# Patient Record
Sex: MALE | Race: WHITE | NOT HISPANIC OR LATINO | Employment: FULL TIME | URBAN - METROPOLITAN AREA
[De-identification: names, ages, dates, MRNs, and addresses within clinical notes are randomized per-mention and may not be internally consistent; named-entity substitution may affect disease eponyms.]

---

## 2017-06-12 ENCOUNTER — HOSPITAL ENCOUNTER (OUTPATIENT)
Dept: LAB | Facility: MEDICAL CENTER | Age: 37
End: 2017-06-12
Attending: FAMILY MEDICINE
Payer: COMMERCIAL

## 2017-06-12 LAB
25(OH)D3 SERPL-MCNC: 20 NG/ML (ref 30–100)
ALBUMIN SERPL BCP-MCNC: 4.1 G/DL (ref 3.2–4.9)
ALBUMIN/GLOB SERPL: 1.3 G/DL
ALP SERPL-CCNC: 53 U/L (ref 30–99)
ALT SERPL-CCNC: 26 U/L (ref 2–50)
ANION GAP SERPL CALC-SCNC: 10 MMOL/L (ref 0–11.9)
AST SERPL-CCNC: 21 U/L (ref 12–45)
BASOPHILS # BLD AUTO: 0.6 % (ref 0–1.8)
BASOPHILS # BLD: 0.05 K/UL (ref 0–0.12)
BILIRUB SERPL-MCNC: 0.8 MG/DL (ref 0.1–1.5)
BUN SERPL-MCNC: 14 MG/DL (ref 8–22)
CALCIUM SERPL-MCNC: 9.4 MG/DL (ref 8.5–10.5)
CHLORIDE SERPL-SCNC: 102 MMOL/L (ref 96–112)
CHOLEST SERPL-MCNC: 201 MG/DL (ref 100–199)
CO2 SERPL-SCNC: 23 MMOL/L (ref 20–33)
CREAT SERPL-MCNC: 1.06 MG/DL (ref 0.5–1.4)
EOSINOPHIL # BLD AUTO: 0.06 K/UL (ref 0–0.51)
EOSINOPHIL NFR BLD: 0.7 % (ref 0–6.9)
ERYTHROCYTE [DISTWIDTH] IN BLOOD BY AUTOMATED COUNT: 41.7 FL (ref 35.9–50)
GFR SERPL CREATININE-BSD FRML MDRD: >60 ML/MIN/1.73 M 2
GLOBULIN SER CALC-MCNC: 3.1 G/DL (ref 1.9–3.5)
GLUCOSE SERPL-MCNC: 86 MG/DL (ref 65–99)
HCT VFR BLD AUTO: 50.1 % (ref 42–52)
HDLC SERPL-MCNC: 33 MG/DL
HGB BLD-MCNC: 17.1 G/DL (ref 14–18)
IMM GRANULOCYTES # BLD AUTO: 0.02 K/UL (ref 0–0.11)
IMM GRANULOCYTES NFR BLD AUTO: 0.2 % (ref 0–0.9)
LDLC SERPL CALC-MCNC: 110 MG/DL
LYMPHOCYTES # BLD AUTO: 2.41 K/UL (ref 1–4.8)
LYMPHOCYTES NFR BLD: 29 % (ref 22–41)
MCH RBC QN AUTO: 30.9 PG (ref 27–33)
MCHC RBC AUTO-ENTMCNC: 34.1 G/DL (ref 33.7–35.3)
MCV RBC AUTO: 90.4 FL (ref 81.4–97.8)
MONOCYTES # BLD AUTO: 0.66 K/UL (ref 0–0.85)
MONOCYTES NFR BLD AUTO: 8 % (ref 0–13.4)
NEUTROPHILS # BLD AUTO: 5.1 K/UL (ref 1.82–7.42)
NEUTROPHILS NFR BLD: 61.5 % (ref 44–72)
NRBC # BLD AUTO: 0 K/UL
NRBC BLD AUTO-RTO: 0 /100 WBC
PLATELET # BLD AUTO: 251 K/UL (ref 164–446)
PMV BLD AUTO: 11.1 FL (ref 9–12.9)
POTASSIUM SERPL-SCNC: 3.5 MMOL/L (ref 3.6–5.5)
PROT SERPL-MCNC: 7.2 G/DL (ref 6–8.2)
RBC # BLD AUTO: 5.54 M/UL (ref 4.7–6.1)
SODIUM SERPL-SCNC: 135 MMOL/L (ref 135–145)
TRIGL SERPL-MCNC: 289 MG/DL (ref 0–149)
WBC # BLD AUTO: 8.3 K/UL (ref 4.8–10.8)

## 2017-06-12 PROCEDURE — 85025 COMPLETE CBC W/AUTO DIFF WBC: CPT

## 2017-06-12 PROCEDURE — 82306 VITAMIN D 25 HYDROXY: CPT

## 2017-06-12 PROCEDURE — 84270 ASSAY OF SEX HORMONE GLOBUL: CPT

## 2017-06-12 PROCEDURE — 36415 COLL VENOUS BLD VENIPUNCTURE: CPT

## 2017-06-12 PROCEDURE — 84403 ASSAY OF TOTAL TESTOSTERONE: CPT

## 2017-06-12 PROCEDURE — 80061 LIPID PANEL: CPT

## 2017-06-12 PROCEDURE — 84402 ASSAY OF FREE TESTOSTERONE: CPT

## 2017-06-12 PROCEDURE — 80053 COMPREHEN METABOLIC PANEL: CPT

## 2017-06-14 LAB
SHBG SERPL-SCNC: 22 NMOL/L (ref 11–80)
TESTOST FREE MFR SERPL: 2.3 % (ref 1.6–2.9)
TESTOST FREE SERPL-MCNC: 118 PG/ML (ref 47–244)
TESTOST SERPL-MCNC: 514 NG/DL (ref 300–1080)

## 2017-07-30 ENCOUNTER — HOSPITAL ENCOUNTER (EMERGENCY)
Facility: MEDICAL CENTER | Age: 37
End: 2017-07-30
Attending: EMERGENCY MEDICINE
Payer: COMMERCIAL

## 2017-07-30 VITALS
OXYGEN SATURATION: 94 % | HEIGHT: 75 IN | RESPIRATION RATE: 16 BRPM | TEMPERATURE: 97.3 F | BODY MASS INDEX: 31.71 KG/M2 | HEART RATE: 88 BPM | SYSTOLIC BLOOD PRESSURE: 141 MMHG | WEIGHT: 255 LBS | DIASTOLIC BLOOD PRESSURE: 97 MMHG

## 2017-07-30 DIAGNOSIS — G51.0 BELL'S PALSY: ICD-10-CM

## 2017-07-30 PROCEDURE — A9270 NON-COVERED ITEM OR SERVICE: HCPCS | Performed by: EMERGENCY MEDICINE

## 2017-07-30 PROCEDURE — 99283 EMERGENCY DEPT VISIT LOW MDM: CPT

## 2017-07-30 PROCEDURE — 700102 HCHG RX REV CODE 250 W/ 637 OVERRIDE(OP): Performed by: EMERGENCY MEDICINE

## 2017-07-30 PROCEDURE — 700111 HCHG RX REV CODE 636 W/ 250 OVERRIDE (IP): Performed by: EMERGENCY MEDICINE

## 2017-07-30 RX ORDER — PREDNISONE 20 MG/1
80 TABLET ORAL ONCE
Status: COMPLETED | OUTPATIENT
Start: 2017-07-30 | End: 2017-07-30

## 2017-07-30 RX ORDER — ACYCLOVIR 200 MG/1
400 CAPSULE ORAL ONCE
Status: COMPLETED | OUTPATIENT
Start: 2017-07-30 | End: 2017-07-30

## 2017-07-30 RX ORDER — PREDNISONE 20 MG/1
60 TABLET ORAL DAILY
Qty: 30 TAB | Refills: 0 | Status: SHIPPED | OUTPATIENT
Start: 2017-07-30 | End: 2017-08-06

## 2017-07-30 RX ORDER — ACYCLOVIR 200 MG/1
400 CAPSULE ORAL
Qty: 70 CAP | Refills: 0 | Status: SHIPPED | OUTPATIENT
Start: 2017-07-30 | End: 2017-08-06

## 2017-07-30 RX ADMIN — ACYCLOVIR 400 MG: 200 CAPSULE ORAL at 02:21

## 2017-07-30 RX ADMIN — PREDNISONE 80 MG: 20 TABLET ORAL at 02:22

## 2017-07-30 NOTE — DISCHARGE INSTRUCTIONS
Your symptoms and physical exam indicate that you have Bell's palsy. There is no evidence of a stroke. Fill your prescriptions and use them as directed. Please call to schedule follow-up with her primary care doctor. Please read the information below. Return to the emergency department for symptoms that extend beyond your face.    Bell's Palsy  Bell's palsy is a condition in which one side of the face becomes temporarily weak or paralyzed. Most of the time no cause is found. A viral infection of the facial nerve is the most commonly suspected cause. The condition almost always clears up in a few weeks to months. However, in a small number of people, the weakness can be permanent. Sometimes steroid medicines and antiviral medicines are prescribed to improve recovery time. Blood and other tests are usually not needed, but they may be performed at your caregiver's discretion, to rule out other causes.  Careful follow up is importantto be sure the facial nerve is recovering. Because facial weakness can make it hard to blink, it is important to prevent drying of the eye. Artificial tears are often prescribed to keep the eye lubricated. Glasses or an eye patch should be worn to protect the eye, if you cannot close your eye completely. If the eye is not protected, permanent damage can be done to the cornea (clear covering over your eye). Sometimes facial massage and exercises help weakened muscles recover.   SEEK IMMEDIATE MEDICAL CARE IF:   · You have increased weakness, earache, headache, or dizziness.  · You develop new problems or symptoms, or the area of weakness or paralysis extends beyond the face.  · You feel you are getting worse.  Document Released: 01/25/2006 Document Revised: 03/11/2013 Document Reviewed: 12/27/2010  [employer]® Patient Information ©2014 [employer].

## 2017-07-30 NOTE — ED NOTES
Work note & prescriptions given to pt.   Kenalog Preparation: Kenalog Treatment Number (Optional): 10 Administered By (Optional): Ann Conway Consent: The risks of atrophy were reviewed with the patient. Lot # (Optional): -DK Ndc# For Kenalog Only: 8566-0501-99 Detail Level: Zone X Size Of Lesion In Cm (Optional): 0 Include Z78.9 (Other Specified Conditions Influencing Health Status) As An Associated Diagnosis?: No Medical Necessity Clause: This procedure was medically necessary because the lesions that were treated were: Total Volume Injected (Ccs- Only Use Numbers And Decimals): 0.2 Concentration Of Solution Injected (Mg/Ml): 0.3 Expiration Date (Optional): apr/2018
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
## 2017-07-30 NOTE — ED AVS SNAPSHOT
7/30/2017    Deven Akers Aye  [address] [place] [address]    Dear Deven:    [facility] wants to ensure your discharge home is safe and you or your loved ones have had all of your questions answered regarding your care after you leave the hospital.    Below is a list of resources and contact information should you have any questions regarding your hospital stay, follow-up instructions, or active medical symptoms.    Questions or Concerns Regarding… Contact   Medical Questions Related to Your Discharge  (7 days a week, 8am-5pm) Contact a Nurse Care Coordinator   [phone]   Medical Questions Not Related to Your Discharge  (24 hours a day / 7 days a week)  Contact the Nurse Health Line   [phone]    Medications or Discharge Instructions Refer to your discharge packet   or contact your [facility] Primary Care Provider   [phone]   Follow-up Appointment(s) Schedule your appointment via [employer]   or contact Scheduling [phone]   Billing Review your statement via [employer]  or contact Billing [phone]   Medical Records Review your records via [employer]   or contact Medical Records [phone]     You may receive a telephone call within two days of discharge. This call is to make certain you understand your discharge instructions and have the opportunity to have any questions answered. You can also easily access your medical information, test results and upcoming appointments via the [employer] free online health management tool. You can learn more and sign up at [employer]. For assistance setting up your [employer] account, please call [phone].    Once again, we want to ensure your discharge home is safe and that you have a clear understanding of any next steps in your care. If you have any questions or concerns, please do not hesitate to contact us, we are here for you. Thank you for choosing [facility] for your healthcare needs.    Sincerely,    Your [facility] Healthcare Team

## 2017-07-30 NOTE — ED AVS SNAPSHOT
[employer] Access Code: [email or web address]  Expires: 8/29/2017  2:39 AM    [employer]  A secure, online tool to manage your health information     [employer]’s [employer]® is a secure, online tool that connects you to your personalized health information from the privacy of your home -- day or night - making it very easy for you to manage your healthcare. Once the activation process is completed, you can even access your medical information using the [employer] matilda, which is available for free in the [employer] Matilda store or Google Play store.     [employer] provides the following levels of access (as shown below):   My Chart Features   [facility] Primary Care Doctor [facility]  Specialists [facility]  Urgent  Care Non-[facility]  Primary Care  Doctor   Email your healthcare team securely and privately 24/7 X X X X   Manage appointments: schedule your next appointment; view details of past/upcoming appointments X      Request prescription refills. X      View recent personal medical records, including lab and immunizations X X X X   View health record, including health history, allergies, medications X X X X   Read reports about your outpatient visits, procedures, consult and ER notes X X X X   See your discharge summary, which is a recap of your hospital and/or ER visit that includes your diagnosis, lab results, and care plan. X X       How to register for [employer]:  1. Go to  [email or web address].  2. Click on the Sign Up Now box, which takes you to the New Member Sign Up page. You will need to provide the following information:  a. Enter your [employer] Access Code exactly as it appears at the top of this page. (You will not need to use this code after you’ve completed the sign-up process. If you do not sign up before the expiration date, you must request a new code.)   b. Enter your date of birth.   c. Enter your home email address.   d. Click Submit, and follow the next screen’s instructions.  3. Create a [employer] ID. This will be your [employer]  login ID and cannot be changed, so think of one that is secure and easy to remember.  4. Create a [employer] password. You can change your password at any time.  5. Enter your Password Reset Question and Answer. This can be used at a later time if you forget your password.   6. Enter your e-mail address. This allows you to receive e-mail notifications when new information is available in [employer].  7. Click Sign Up. You can now view your health information.    For assistance activating your [employer] account, call [phone]

## 2017-07-30 NOTE — ED AVS SNAPSHOT
Home Care Instructions                                                                                                                Deven Griffiths   MRN: [id number]    Department:  [facility], Emergency Dept   Date of Visit:  7/30/2017            [facility], Emergency Dept    [address] [place] [address]    Phone:  [phone]      You were seen by     Lj Garsia M.D.      Your Diagnosis Was     Bell's palsy     G51.0       These are the medications you received during your hospitalization from 07/30/2017 0206 to 07/30/2017 0239     Date/Time Order Dose Route Action    07/30/2017 0222 predniSONE (DELTASONE) tablet 80 mg 80 mg Oral Given    07/30/2017 0221 acyclovir (ZOVIRAX) capsule 400 mg 400 mg Oral Given      Follow-up Information     1. Follow up with Mike Phoenix M.D.. Call in 1 day.    Specialty:  Family Medicine    Contact information    [address] [place] [address] [phone]        Medication Information     Review all of your home medications and newly ordered medications with your primary doctor and/or pharmacist as soon as possible. Follow medication instructions as directed by your doctor and/or pharmacist.     Please keep your complete medication list with you and share with your physician. Update the information when medications are discontinued, doses are changed, or new medications (including over-the-counter products) are added; and carry medication information at all times in the event of emergency situations.               Medication List      START taking these medications        Instructions    Morning Afternoon Evening Bedtime    acyclovir 200 MG Caps   Last time this was given:  400 mg on 7/30/2017  2:21 AM   Commonly known as:  ZOVIRAX        Take 2 Caps by mouth 5 Times a Day for 7 days.   Dose:  400 mg                        predniSONE 20 MG Tabs   Last time this was given:  80 mg on  7/30/2017  2:22 AM   Commonly known as:  DELTASONE        Take 3 Tabs by mouth every day for 7 days.   Dose:  60 mg                          ASK your doctor about these medications        Instructions    Morning Afternoon Evening Bedtime    cyclobenzaprine 5 MG tablet   Commonly known as:  FLEXERIL        Take 1-2 Tabs by mouth 3 times a day as needed.   Dose:  5-10 mg                             Where to Get Your Medications      You can get these medications from any pharmacy     Bring a paper prescription for each of these medications    - acyclovir 200 MG Caps  - predniSONE 20 MG Tabs              Discharge Instructions       Your symptoms and physical exam indicate that you have Bell's palsy. There is no evidence of a stroke. Fill your prescriptions and use them as directed. Please call to schedule follow-up with her primary care doctor. Please read the information below. Return to the emergency department for symptoms that extend beyond your face.    Bell's Palsy  Bell's palsy is a condition in which one side of the face becomes temporarily weak or paralyzed. Most of the time no cause is found. A viral infection of the facial nerve is the most commonly suspected cause. The condition almost always clears up in a few weeks to months. However, in a small number of people, the weakness can be permanent. Sometimes steroid medicines and antiviral medicines are prescribed to improve recovery time. Blood and other tests are usually not needed, but they may be performed at your caregiver's discretion, to rule out other causes.  Careful follow up is importantto be sure the facial nerve is recovering. Because facial weakness can make it hard to blink, it is important to prevent drying of the eye. Artificial tears are often prescribed to keep the eye lubricated. Glasses or an eye patch should be worn to protect the eye, if you cannot close your eye completely. If the eye is not protected, permanent damage can be done to  the cornea (clear covering over your eye). Sometimes facial massage and exercises help weakened muscles recover.   SEEK IMMEDIATE MEDICAL CARE IF:   · You have increased weakness, earache, headache, or dizziness.  · You develop new problems or symptoms, or the area of weakness or paralysis extends beyond the face.  · You feel you are getting worse.  Document Released: 01/25/2006 Document Revised: 03/11/2013 Document Reviewed: 12/27/2010  ExitCare® Patient Information ©2014 [employer].            Patient Information     Patient Information    Following emergency treatment: all patient requiring follow-up care must return either to a private physician or a clinic if your condition worsens before you are able to obtain further medical attention, please return to the emergency room.     Billing Information    At [facility], we work to make the billing process streamlined for our patients.  Our Representatives are here to answer any questions you may have regarding your hospital bill.  If you have insurance coverage and have supplied your insurance information to us, we will submit a claim to your insurer on your behalf.  Should you have any questions regarding your bill, we can be reached online or by phone as follows:  Online: You are able pay your bills online or live chat with our representatives about any billing questions you may have. We are here to help Monday - Friday from 8:00am to 7:30pm and 9:00am - 12:00pm on Saturdays.  Please visit [email or web address]  for more information.   Phone:  [phone] or [phone]    Please note that your emergency physician, surgeon, pathologist, radiologist, anesthesiologist, and other specialists are not employed by [facility] and will therefore bill separately for their services.  Please contact them directly for any questions concerning their bills at the numbers below:     Emergency Physician Services:  [phone]  [facility]:  [phone]  [facility]:  [phone]  [facility]:  [phone]    1. Your final bill may vary from the amount quoted upon discharge if all procedures are not complete at that time, or if your doctor has additional procedures of which we are not aware. You will receive an additional bill if you return to the Emergency Department at [facility] for suture removal regardless of the facility of which the sutures were placed.     2. Please arrange for settlement of this account at the emergency registration.    3. All self-pay accounts are due in full at the time of treatment.  If you are unable to meet this obligation then payment is expected within 4-5 days.     4. If you have had radiology studies (CT, X-ray, Ultrasound, MRI), you have received a preliminary result during your emergency department visit. Please contact the radiology department [phone] to receive a copy of your final result. Please discuss the Final result with your primary physician or with the follow up physician provided.     Crisis Hotline:  [place] Crisis Hotline:  8-539-QXDVDFL or 1-636.792.3884  [place] Crisis Hotline:    1-825.639.8717 or 762-117-7587         ED Discharge Follow Up Questions    1. In order to provide you with very good care, we would like to follow up with a phone call in the next few days.  May we have your permission to contact you?     YES /  NO    2. What is the best phone number to call you? (       )_____-__________    3. What is the best time to call you?      Morning  /  Afternoon  /  Evening                   Patient Signature:  ____________________________________________________________    Date:  ____________________________________________________________

## 2017-07-30 NOTE — ED NOTES
Pt D/C to home. D/C instructions and prescriptions given to patient. Pt to make f/u apt with PCP and to Return to the ER if symptoms extend beyond your face. Pt verbalizes understanding. Pt leaves ED with no acute changes, complaints or concerns.

## 2017-07-30 NOTE — ED NOTES
".  Chief Complaint   Patient presents with   • Facial Droop     x30 min on the right side. this has happened previously in 2000 and the pt was diagnosed with bells palsy   • Numbness     right side of tongue     .Blood pressure 141/97, pulse 88, temperature 36.3 °C (97.3 °F), resp. rate 16, height 1.905 m (6' 3\"), weight 115.667 kg (255 lb), SpO2 94 %.      Pt has history of [place] Palsy.   "

## 2017-08-05 ENCOUNTER — HOSPITAL ENCOUNTER (OUTPATIENT)
Dept: LAB | Facility: MEDICAL CENTER | Age: 37
End: 2017-08-05
Attending: NURSE PRACTITIONER
Payer: COMMERCIAL

## 2017-08-05 LAB
BASOPHILS # BLD AUTO: 0.6 % (ref 0–1.8)
BASOPHILS # BLD: 0.06 K/UL (ref 0–0.12)
EOSINOPHIL # BLD AUTO: 0.03 K/UL (ref 0–0.51)
EOSINOPHIL NFR BLD: 0.3 % (ref 0–6.9)
ERYTHROCYTE [DISTWIDTH] IN BLOOD BY AUTOMATED COUNT: 44.2 FL (ref 35.9–50)
ERYTHROCYTE [SEDIMENTATION RATE] IN BLOOD BY WESTERGREN METHOD: 0 MM/HOUR (ref 0–15)
HCT VFR BLD AUTO: 53.4 % (ref 42–52)
HGB BLD-MCNC: 17.9 G/DL (ref 14–18)
IMM GRANULOCYTES # BLD AUTO: 0.03 K/UL (ref 0–0.11)
IMM GRANULOCYTES NFR BLD AUTO: 0.3 % (ref 0–0.9)
LYMPHOCYTES # BLD AUTO: 3.06 K/UL (ref 1–4.8)
LYMPHOCYTES NFR BLD: 32.8 % (ref 22–41)
MCH RBC QN AUTO: 30.7 PG (ref 27–33)
MCHC RBC AUTO-ENTMCNC: 33.5 G/DL (ref 33.7–35.3)
MCV RBC AUTO: 91.6 FL (ref 81.4–97.8)
MONOCYTES # BLD AUTO: 0.54 K/UL (ref 0–0.85)
MONOCYTES NFR BLD AUTO: 5.8 % (ref 0–13.4)
NEUTROPHILS # BLD AUTO: 5.62 K/UL (ref 1.82–7.42)
NEUTROPHILS NFR BLD: 60.2 % (ref 44–72)
NRBC # BLD AUTO: 0 K/UL
NRBC BLD AUTO-RTO: 0 /100 WBC
PLATELET # BLD AUTO: 250 K/UL (ref 164–446)
PMV BLD AUTO: 11.1 FL (ref 9–12.9)
RBC # BLD AUTO: 5.83 M/UL (ref 4.7–6.1)
RHEUMATOID FACT SER IA-ACNC: <10 IU/ML (ref 0–14)
WBC # BLD AUTO: 9.3 K/UL (ref 4.8–10.8)

## 2017-08-05 PROCEDURE — 36415 COLL VENOUS BLD VENIPUNCTURE: CPT

## 2017-08-05 PROCEDURE — 86431 RHEUMATOID FACTOR QUANT: CPT

## 2017-08-05 PROCEDURE — 85652 RBC SED RATE AUTOMATED: CPT

## 2017-08-05 PROCEDURE — 86038 ANTINUCLEAR ANTIBODIES: CPT

## 2017-08-05 PROCEDURE — 85025 COMPLETE CBC W/AUTO DIFF WBC: CPT

## 2017-08-07 LAB — NUCLEAR IGG SER QL IA: NORMAL

## 2017-08-16 NOTE — ED PROVIDER NOTES
ED Provider Note    Scribed for No att. providers found by Lj Garsia. 7/30/2017  2:30 AM    CHIEF COMPLAINT  Chief Complaint   Patient presents with   • Facial Droop     x30 min on the right side. this has happened previously in 2000 and the pt was diagnosed with bells palsy   • Numbness     right side of tongue       HPI  Deven Griffiths is a 36 y.o. male who presents to the Emergency Room with right-sided facial G for the past 30 minutes with associated numbness of the right side of his tongue. He has a history of Bell's palsy, and feels that this is identical. He denies any weakness or dizziness or confusion. He's had no trauma. He is an employee of the [employer], noticed symptoms while driving, during work. He came straight to the hospital. He did not have any difficulty driving or finding his way. He is fully alert and oriented. His initial impression the bedside is not of a stroke, but rather of an isolated cranial nerve VII palsy. He denies any recent fevers or chills or nausea or vomiting or vision    REVIEW OF SYSTEMS  See HPI for further details.    PAST MEDICAL HISTORY   has a past medical history of Hyperlipidemia. and Bell's palsy.    SOCIAL HISTORY  Social History     Social History Main Topics   • Smoking status: Never Smoker    • Smokeless tobacco: Never Used   • Alcohol Use: Yes      Comment: rare   • Drug Use: No   • Sexual Activity:     Partners: Female     Birth Control/ Protection: Pill       SURGICAL HISTORY   has past surgical history that includes lumbar laminectomy diskectomy (2005).    CURRENT MEDICATIONS  Home Medications     Reviewed by Erika Munguia R.N. (Registered Nurse) on 07/30/17 at 0219  Med List Status: Complete    Medication Last Dose Status    cyclobenzaprine (FLEXERIL) 5 MG tablet prn Active                ALLERGIES  No Known Allergies    PHYSICAL EXAM  VITAL SIGNS: /97 mmHg  Pulse 88  Temp(Src) 36.3 °C (97.3 °F)  Resp 16  Ht 1.905 m (6'  "3\")  Wt 115.667 kg (255 lb)  BMI 31.87 kg/m2  SpO2 94%  Pulse ox interpretation: I interpret this pulse ox as normal.  Constitutional: Alert in no apparent distress.  HENT: Normocephalic, Atraumatic, Bilateral external ears normal. Nose normal.   Eyes: Conjunctiva normal, non-icteric.   Heart: Regular rate and rythm, no murmurs.    Lungs: Clear to auscultation bilaterally.  Skin: Warm, Dry, No erythema, No rash.   Neurologic: Alert, isolated right-sided cranial nerve VII palsy. The forehead is not spared. There is flattening of the affected nasolabial fold. His smile jaws his face to the unaffected side. Cranial nerves VI is intact. He is still able to completely close his right eyelid, though it requires significant effort.  Psychiatric: Affect normal, Judgment normal, Mood normal, Appears appropriate and not intoxicated.     COURSE & MEDICAL DECISION MAKING  The patient's VS, Nurses notes reviewed. (See chart for details)    2:40 AM Patient seen and examined at bedside. This patient has clear evidence of Bell's palsy, and no evidence of a stroke. He immediately started on prednisone and acyclovir. We discussed the importance of keeping his eye moist, and I demonstrated how to tape his eyelid to his cheek during sleep, and recommended that he  some normal saline eyedrops that he feels prescription. He'll follow up with his primary care doctor within the next week to ensure improvement, and to address any other concerns. I also discussed return precautions for neurologic symptoms other than the isolated once he has now. The patient verbalizes understanding and is discharged in stable condition.       The patient will return for new or worsening symptoms and is stable at the time of discharge.    The patient is referred to a primary physician for blood pressure management, diabetic screening, and for all other preventative health concerns.    DISPOSITION:  Patient will be discharged home in stable " condition.    FOLLOW UP:  Mike Phoenix M.D.  [address] [address] [phone]    Call in 1 day        OUTPATIENT MEDICATIONS:  Discharge Medication List as of 7/30/2017  2:39 AM      START taking these medications    Details   predniSONE (DELTASONE) 20 MG Tab Take 3 Tabs by mouth every day for 7 days., Disp-30 Tab, R-0, Print Rx Paper      acyclovir (ZOVIRAX) 200 MG Cap Take 2 Caps by mouth 5 Times a Day for 7 days., Disp-70 Cap, R-0, Print Rx Paper               FINAL IMPRESSION  1. Bell's palsy

## 2017-11-14 ENCOUNTER — APPOINTMENT (OUTPATIENT)
Dept: SOCIAL WORK | Facility: CLINIC | Age: 37
End: 2017-11-14
Payer: COMMERCIAL

## 2017-11-14 PROCEDURE — 90686 IIV4 VACC NO PRSV 0.5 ML IM: CPT | Performed by: REGISTERED NURSE

## 2017-11-14 PROCEDURE — 90471 IMMUNIZATION ADMIN: CPT | Performed by: REGISTERED NURSE

## 2017-12-29 ENCOUNTER — OFFICE VISIT (OUTPATIENT)
Dept: NEUROLOGY | Facility: MEDICAL CENTER | Age: 37
End: 2017-12-29
Payer: COMMERCIAL

## 2017-12-29 VITALS
RESPIRATION RATE: 16 BRPM | HEART RATE: 94 BPM | DIASTOLIC BLOOD PRESSURE: 70 MMHG | BODY MASS INDEX: 32.69 KG/M2 | TEMPERATURE: 97 F | OXYGEN SATURATION: 94 % | SYSTOLIC BLOOD PRESSURE: 118 MMHG | WEIGHT: 262.9 LBS | HEIGHT: 75 IN

## 2017-12-29 DIAGNOSIS — Z86.69 HISTORY OF BELL'S PALSY: ICD-10-CM

## 2017-12-29 PROCEDURE — 99204 OFFICE O/P NEW MOD 45 MIN: CPT | Performed by: PSYCHIATRY & NEUROLOGY

## 2017-12-29 ASSESSMENT — PATIENT HEALTH QUESTIONNAIRE - PHQ9: CLINICAL INTERPRETATION OF PHQ2 SCORE: 0

## 2017-12-29 NOTE — PATIENT INSTRUCTIONS
IMPRESSION:    1. 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 Mixed hyperlipidemia, Hyper TG for 15+ years 300+ highest    PLAN/RECOMMENDATIONS:      May try the following to reduce triglyceride  ________________________________________________________________________    Flaxseed Oil -- Omega 3 1000mg 3# daily  ________________________________________________________________________          Melkersson [place] Syndrome is possible  We also discuss about the sequelae of bells palsy  Loudness hyperacusis ,synkinesis and hemifacial spasms.         The patient's tongue          SIGNATURE:  Jennifer Brown      CC:  Sherri Santiago M.D.    ________________________________________________________________________    Melkersson Vanna Syndrome    General Discussion  Melkersson Vanna syndrome is a rare neurological disorder characterized by recurrent, long lasting swelling of the face, particularly one or both lips (granulomatous cheilitis), facial muscle weakness (palsy) and a fissured tongue. Some affected individuals may have all three of these features and others may have only one or two.    Signs & Symptoms  The first symptom of MRS is usually swelling of the upper lip, lower lip, one or both cheeks, eyelids, or rarely, one side of the scalp. The first episode may resolve in hours or days, but swelling may be more severe and last longer in subsequent episodes and can become permanent. The enlarged lips may appear cracked and discolored and can be painful. Fever, headache and visual disturbances sometimes occur with an episode. A fissured tongue is seen in 20-40% of those affected and may be present since birth. Salivary gland secretion may be reduced and the sense of taste may be diminished. Facial palsy occurs in about 30% of those affected. It usually occurs after episodes of lip swelling have already  occurred but is sometimes the first symptom. The facial palsy can be on one side or both, resolves initially, but can become permanent.      Causes  MRS is thought to be caused by genetic factors in some cases because families have been described in which multiple members are affected. MRS is sometimes a symptom of another condition such as Crohn’s disease or sarcoidosis. Dietary and other allergens may also be involved.          ________________________________________________________________________      ________________________________________________________________________      Hypertriglyceridemia and peripheral neuropathy might be related-- please see the following reference    PREFERENCE whether to add one more medicine to control Hypertriglyceridemia    Combination therapy raises safety concerns. All statins (especially at higher doses) increase the risk of rhabdomyolysis; this risk may be compounded by fibrate use. Cerivastatin (Baycol) was withdrawn from the market because of reports of fatal rhabdomyolysis, often in patients also taking gem-fibrozil (Lopid). An increased risk also has been shown with rosuvastatin (Crestor).18,19 When combined with statins, gemfibrozil may increase serum statin levels by inhibiting statin metabolism.    Compared with gemfibrozil/statin therapy, fenofibrate/statin therapy has a lower incidence and reported rate of rhabdomyolysis and may be safer.20,21 However, long-term safety and outcome data for fibrate/statin combinations are lacking, and combination therapy should be used with caution. Patients should receive the lowest possible statin dosage, be monitored closely for side effects (e.g., muscle pain, brown urine), and be given the opportunity for proper informed consent.    Neuro Endocrinol Iggy. 2005 Dec;26(6):775-9. Hypertriglyceridemia and peripheral neuropathy in neurologically asymptomatic patients.  Rogerio HS1, Aurelio ST, Flori MS, Alec  RA.  ________________________________________________________________________

## 2017-12-29 NOTE — PROGRESS NOTES
NEUROLOGY NOTE    Referring Physician  Sherri Santiago M.D.      CHIEF COMPLAINT:  Recurrent bell palsy   Chief Complaint   Patient presents with   • Establish Care     Dunbar palsy       PRESENT ILLNESS:     2000 and 2017 two episodes of bells palsy  7/30/2017, due to right facial palsy, the patient came to ER of [facility]   Facial Droop       x30 min on the right side. this has happened previously in 2000 and the pt was diagnosed with bells palsy   • Numbness       right side of tongue     In 2000, the patient just came back from [place] -- the patient was in [facility]     PAST MEDICAL HISTORY:  Past Medical History:   Diagnosis Date   • Hyperlipidemia        PAST SURGICAL HISTORY:  Past Surgical History:   Procedure Laterality Date   • LUMBAR LAMINECTOMY DISKECTOMY  2005    L5/S1 Dr Jaime       FAMILY HISTORY:  Family History   Problem Relation Age of Onset   • Heart Disease Father        SOCIAL HISTORY:  Social History     Social History   • Marital status:      Spouse name: N/A   • Number of children: N/A   • Years of education: N/A     Occupational History   • Not on file.     Social History Main Topics   • Smoking status: Never Smoker   • Smokeless tobacco: Never Used   • Alcohol use Yes      Comment: rare   • Drug use: No   • Sexual activity: Yes     Partners: Female     Birth control/ protection: Pill     Other Topics Concern   • Not on file     Social History Narrative   • No narrative on file     ALLERGIES:  No Known Allergies  TOBHX  History   Smoking Status   • Never Smoker   Smokeless Tobacco   • Never Used     ALCHX  History   Alcohol Use   • Yes     Comment: rare     DRUGHX  History   Drug Use No           MEDICATIONS:  Current Outpatient Prescriptions   Medication   • cyclobenzaprine (FLEXERIL) 5 MG tablet     No current facility-administered medications for this visit.        REVIEW OF SYSTEM:    Constitutional: Denies fevers, Denies weight changes   Eyes: Denies changes in vision, no eye pain   "  Ears/Nose/Throat/Mouth: Denies nasal congestion or sore throat   Cardiovascular: high TG  Respiratory: Denies SOB.   Gastrointestinal/Hepatic: Denies abdominal pain, nausea, vomiting, diarrhea, constipation or GI bleeding   Genitourinary: Denies bladder dysfunction, dysuria or frequency   Musculoskeletal/Rheum: Denies joint pain and swelling   Skin/Breast: Denies rash, denies breast lumps or discharge   Neurological: recurrent right facial palsy,   Psychiatric: denies mood disorder   Endocrine: denies hx of diabetes or thyroid dysfunction   Heme/Oncology/Lymph Nodes: Denies enlarged lymph nodes, denies brusing or known bleeding disorder   Allergic/Immunologic: Denies hx of allergies         PHYSICAL AND NEUROLOGICAL EXMAINATIONS:  VITAL SIGNS: /70   Pulse 94   Temp 36.1 °C (97 °F)   Resp 16   Ht 1.905 m (6' 3\")   Wt 119.3 kg (262 lb 14.4 oz)   SpO2 94%   BMI 32.86 kg/m²   CURRENT WEIGHT: [unfilled]  BMI: Body mass index is 32.86 kg/m².  PREVIOUS WEIGHTS:  Wt Readings from Last 25 Encounters:   12/29/17 119.3 kg (262 lb 14.4 oz)   07/30/17 115.7 kg (255 lb)   12/06/16 113.9 kg (251 lb)   08/08/16 113.4 kg (250 lb)   04/29/16 116.6 kg (257 lb)   04/20/16 115.7 kg (255 lb)   05/20/14 112.5 kg (248 lb)   12/04/13 112.5 kg (248 lb)   06/04/13 111.1 kg (245 lb)   06/04/13 111.1 kg (245 lb)   02/05/13 112.5 kg (248 lb)   01/04/13 119.7 kg (264 lb)   12/21/12 119.7 kg (264 lb)   10/28/11 119.7 kg (264 lb)   10/21/11 104.3 kg (230 lb)       General appearance of patient: WDWN(+) NAD(+)    EYES  o Fundus : Papilledem(-) Exudates(-) Hemorrhage(-)  Nervous System  Orientation to time, place and person(+)  Memory normal(+)  Language: aphasia(-)  Knowledge: past(+) Current(+)  Attention(+)  Cranial Nerves  • Nerve 2: intact  • Nerve 3,4,6: intact  • Nerve 5 : intact  • Nerve 7: still synkinesia and hyperacusia  • Nerve 8: intact  • Nerve 9 & 10: intact  • Nerve 11: intact  • Nerve 12: intact  Muscle Power and muscle " tone: symmetric, normal in upper and lower  Sensory System: Pin sensation intact(+)  Reflexes: symmetric throughout  Cerebellar Function FNP normal   Gait : Steady(+) TandemGait steady(+)  Heart and Vascular  Peripheral Vasucular system : Edema (-) Swelling(-)  RHB, Breathing sound clear  abdomen bowel sound normoactive  Extremities freely moveable  Joints no contracture       NEUROIMAGING: I reviewed the MRI/CT of brain       LAB:            IMPRESSION:    1. 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 Mixed hyperlipidemia, Hyper TG for 15+ years 300+ highest    PLAN/RECOMMENDATIONS:      May try the following to reduce triglyceride  ________________________________________________________________________    Flaxseed Oil -- Omega 3 1000mg 3# daily  ________________________________________________________________________          Melkersson Vanna Syndrome is possible  We also discuss about the sequelae of bells palsy  Loudness hyperacusis ,synkinesis and hemifacial spasms.         The patient's tongue          SIGNATURE:  Jennifer Brown      CC:  Sherri Santiago M.D.    ________________________________________________________________________    Melkersson Vanna Syndrome    General Discussion  Melkersson Vanna syndrome is a rare neurological disorder characterized by recurrent, long lasting swelling of the face, particularly one or both lips (granulomatous cheilitis), facial muscle weakness (palsy) and a fissured tongue. Some affected individuals may have all three of these features and others may have only one or two.    Signs & Symptoms  The first symptom of MRS is usually swelling of the upper lip, lower lip, one or both cheeks, eyelids, or rarely, one side of the scalp. The first episode may resolve in hours or days, but swelling may be more severe and last longer in subsequent episodes and can  become permanent. The enlarged lips may appear cracked and discolored and can be painful. Fever, headache and visual disturbances sometimes occur with an episode. A fissured tongue is seen in 20-40% of those affected and may be present since birth. Salivary gland secretion may be reduced and the sense of taste may be diminished. Facial palsy occurs in about 30% of those affected. It usually occurs after episodes of lip swelling have already occurred but is sometimes the first symptom. The facial palsy can be on one side or both, resolves initially, but can become permanent.      Causes  MRS is thought to be caused by genetic factors in some cases because families have been described in which multiple members are affected. MRS is sometimes a symptom of another condition such as Crohn’s disease or sarcoidosis. Dietary and other allergens may also be involved.          ________________________________________________________________________      ________________________________________________________________________      Hypertriglyceridemia and peripheral neuropathy might be related-- please see the following reference    PREFERENCE whether to add one more medicine to control Hypertriglyceridemia    Combination therapy raises safety concerns. All statins (especially at higher doses) increase the risk of rhabdomyolysis; this risk may be compounded by fibrate use. Cerivastatin (Baycol) was withdrawn from the market because of reports of fatal rhabdomyolysis, often in patients also taking gem-fibrozil (Lopid). An increased risk also has been shown with rosuvastatin (Crestor).18,19 When combined with statins, gemfibrozil may increase serum statin levels by inhibiting statin metabolism.    Compared with gemfibrozil/statin therapy, fenofibrate/statin therapy has a lower incidence and reported rate of rhabdomyolysis and may be safer.20,21 However, long-term safety and outcome data for fibrate/statin combinations are  lacking, and combination therapy should be used with caution. Patients should receive the lowest possible statin dosage, be monitored closely for side effects (e.g., muscle pain, brown urine), and be given the opportunity for proper informed consent.    Neuro Endocrinol Iggy. 2005 Dec;26(6):775-9. Hypertriglyceridemia and peripheral neuropathy in neurologically asymptomatic patients.  Rogerio HS1, Aurelio ST, Flori MS, Alec RA.  ________________________________________________________________________

## 2018-01-11 ENCOUNTER — OFFICE VISIT (OUTPATIENT)
Dept: MEDICAL GROUP | Facility: PHYSICIAN GROUP | Age: 38
End: 2018-01-11
Payer: COMMERCIAL

## 2018-01-11 VITALS
OXYGEN SATURATION: 96 % | SYSTOLIC BLOOD PRESSURE: 116 MMHG | HEIGHT: 75 IN | HEART RATE: 90 BPM | BODY MASS INDEX: 31.58 KG/M2 | WEIGHT: 254 LBS | TEMPERATURE: 96.9 F | RESPIRATION RATE: 14 BRPM | DIASTOLIC BLOOD PRESSURE: 78 MMHG

## 2018-01-11 DIAGNOSIS — E78.2 MIXED HYPERLIPIDEMIA: ICD-10-CM

## 2018-01-11 DIAGNOSIS — M54.5 ACUTE LOW BACK PAIN, UNSPECIFIED BACK PAIN LATERALITY, WITH SCIATICA PRESENCE UNSPECIFIED: ICD-10-CM

## 2018-01-11 DIAGNOSIS — Z13.29 SCREENING FOR ENDOCRINE DISORDER: ICD-10-CM

## 2018-01-11 PROCEDURE — 99214 OFFICE O/P EST MOD 30 MIN: CPT | Performed by: FAMILY MEDICINE

## 2018-01-11 RX ORDER — M-VIT,TX,IRON,MINS/CALC/FOLIC 27MG-0.4MG
1 TABLET ORAL DAILY
COMMUNITY

## 2018-01-11 NOTE — PROGRESS NOTES
Chief Complaint   Patient presents with   • Establish Care   • Referral Needed     chiropractor   • Labs Only       HISTORY OF PRESENT ILLNESS: Patient is a 37 y.o. 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  Requests updated labs, last set showed improving cholesterol numbers with use of low carb and fasting type diet.  Continues with regular exercise.  He has family hx of early CAD in father who had MI in his 50s.      Deven has hx of low back pain with hx of lumbar laminectomy in 2005. He requests referral to his chiropractor for ongoing treatment.          Past Medical, Social, and Family history reviewed and updated in EPIC    Allergies:Patient has no known allergies.    Current Outpatient Prescriptions   Medication Sig Dispense Refill   • Cyanocobalamin (VITAMIN B-12 PO) Take  by mouth.     • Ascorbic Acid (VITAMIN C PO) Take  by mouth.     • Omega-3 Fatty Acids (FISH OIL PO) Take  by mouth.     • Omega-3 Fatty Acids (OMEGA 3 PO) Take  by mouth.     • GLUCOSAMINE-CHONDROITIN PO Take  by mouth.     • therapeutic multivitamin-minerals (THERAGRAN-M) Tab Take 1 Tab by mouth every day.     • cyclobenzaprine (FLEXERIL) 5 MG tablet Take 1-2 Tabs by mouth 3 times a day as needed. 45 Tab 0     No current facility-administered medications for this visit.          ROS:  Review of Systems   Constitutional: Negative for fever, chills, weight loss and malaise/fatigue.   HENT: Negative for ear pain, nosebleeds, congestion, sore throat and neck pain.    Eyes: Negative for blurred vision.   Respiratory: Negative for cough, sputum production, shortness of breath and wheezing.    Cardiovascular: Negative for chest pain, palpitations,  and leg swelling.   Gastrointestinal: Negative for heartburn, nausea, vomiting, diarrhea and abdominal pain.   Genitourinary: Negative for dysuria, urgency and frequency.   Musculoskeletal: Negative for  "myalgias, back pain and joint pain.   Skin: Negative for rash and itching.   Neurological: Negative for dizziness, tingling, tremors, sensory change, focal weakness and headaches.   Endo/Heme/Allergies: Does not bruise/bleed easily.   Psychiatric/Behavioral: Negative for depression, anxiety, suicidal ideas, insomnia and memory loss.      Exam:  Blood pressure 116/78, pulse 90, temperature 36.1 °C (96.9 °F), resp. rate 14, height 1.905 m (6' 3\"), weight 115.2 kg (254 lb), SpO2 96 %.    General:  Well nourished, well developed in NAD  Head is grossly normal.  Neck: Supple without JVD   Pulmonary:  Normal effort.   Cardiovascular: Regular rate  Extremities: no clubbing, cyanosis, or edema.  Psych: affect appropriate      Please note that this dictation was created using voice recognition software. I have made every reasonable attempt to correct obvious errors, but I expect that there are errors of grammar and possibly content that I did not discover before finalizing the note.    Assessment/Plan:  1. Mixed hyperlipidemia  Continue with therapeutic lifestyle changes.   - COMP METABOLIC PANEL; Future  - LIPID PROFILE; Future    2. Acute low back pain, unspecified back pain laterality, with sciatica presence unspecified  - REFERRAL TO OTHER    3. Screening for endocrine disorder  - TESTOSTERONE SERUM; Future    Follow up in 3-6 months, sooner prn lab.        "

## 2018-02-15 ENCOUNTER — OFFICE VISIT (OUTPATIENT)
Dept: MEDICAL GROUP | Facility: PHYSICIAN GROUP | Age: 38
End: 2018-02-15
Payer: COMMERCIAL

## 2018-02-15 VITALS
DIASTOLIC BLOOD PRESSURE: 74 MMHG | HEART RATE: 82 BPM | HEIGHT: 75 IN | WEIGHT: 247 LBS | SYSTOLIC BLOOD PRESSURE: 120 MMHG | BODY MASS INDEX: 30.71 KG/M2 | RESPIRATION RATE: 16 BRPM | OXYGEN SATURATION: 95 % | TEMPERATURE: 97.4 F

## 2018-02-15 DIAGNOSIS — J18.9 PNEUMONIA OF LEFT LOWER LOBE DUE TO INFECTIOUS ORGANISM: ICD-10-CM

## 2018-02-15 DIAGNOSIS — J06.9 UPPER RESPIRATORY TRACT INFECTION, UNSPECIFIED TYPE: ICD-10-CM

## 2018-02-15 PROCEDURE — 99214 OFFICE O/P EST MOD 30 MIN: CPT | Performed by: FAMILY MEDICINE

## 2018-02-15 RX ORDER — AZITHROMYCIN 250 MG/1
TABLET, FILM COATED ORAL
Qty: 6 TAB | Refills: 0 | Status: SHIPPED | OUTPATIENT
Start: 2018-02-15 | End: 2018-04-24

## 2018-02-16 NOTE — PROGRESS NOTES
Chief Complaint   Patient presents with   • Cough     x 1 wk   • Ear Fullness     bilateral ear       HISTORY OF PRESENT ILLNESS: Patient is a 37 y.o. male established patient here today for the following concerns:    1. Upper respiratory tract infection, unspecified type  2. Pneumonia of left lower lobe due to infectious organism (CMS-HCC)  Here today with cough, congestion, body aches and ear fullness x 7 days now. Reports initially felt very fatigued.  No getting easily winded with productive cough and feels things are worsening rather than improving.  No wheezing.        Past Medical, Social, and Family history reviewed and updated in EPIC    Allergies:Patient has no known allergies.    Current Outpatient Prescriptions   Medication Sig Dispense Refill   • azithromycin (ZITHROMAX) 250 MG Tab As directed 6 Tab 0   • Cyanocobalamin (VITAMIN B-12 PO) Take  by mouth.     • Ascorbic Acid (VITAMIN C PO) Take  by mouth.     • Omega-3 Fatty Acids (FISH OIL PO) Take  by mouth.     • Omega-3 Fatty Acids (OMEGA 3 PO) Take  by mouth.     • GLUCOSAMINE-CHONDROITIN PO Take  by mouth.     • therapeutic multivitamin-minerals (THERAGRAN-M) Tab Take 1 Tab by mouth every day.     • cyclobenzaprine (FLEXERIL) 5 MG tablet Take 1-2 Tabs by mouth 3 times a day as needed. 45 Tab 0     No current facility-administered medications for this visit.          ROS:  Review of Systems   Constitutional: Negative for fever, chills, weight loss and malaise/fatigue.   HENT: Negative for ear pain, nosebleeds, congestion, sore throat and neck pain.    Eyes: Negative for blurred vision.   Respiratory: Negative for cough, sputum production, shortness of breath and wheezing.    Cardiovascular: Negative for chest pain, palpitations,  and leg swelling.   Gastrointestinal: Negative for heartburn, nausea, vomiting, diarrhea and abdominal pain.   Genitourinary: Negative for dysuria, urgency and frequency.   Musculoskeletal: Negative for myalgias, back pain  "and joint pain.   Skin: Negative for rash and itching.   Neurological: Negative for dizziness, tingling, tremors, sensory change, focal weakness and headaches.   Endo/Heme/Allergies: Does not bruise/bleed easily.   Psychiatric/Behavioral: Negative for depression, anxiety, suicidal ideas, insomnia and memory loss.      Exam:  Blood pressure 120/74, pulse 82, temperature 36.3 °C (97.4 °F), resp. rate 16, height 1.905 m (6' 3\"), weight 112 kg (247 lb), SpO2 95 %.    General:  Well nourished, well developed in NAD  Head is grossly normal.  HEENT: Normocephalic and atraumatic. TMs clear bilaterally. Oropharynx is clear without erythema and no tonsillar exudate. Nasal mucosa pink with minimal rhinorrhea.  Neck: Supple without JVD   Pulmonary:  Normal effort. LLL rhonchi and crackles noted  Cardiovascular: Regular rate  Extremities: no clubbing, cyanosis, or edema.  Psych: affect appropriate      Please note that this dictation was created using voice recognition software. I have made every reasonable attempt to correct obvious errors, but I expect that there are errors of grammar and possibly content that I did not discover before finalizing the note.    Assessment/Plan:  1. Upper respiratory tract infection, unspecified type  2. Pneumonia of left lower lobe due to infectious organism (CMS-HCC)  Possibly still viral, but given worsening will empirically treat.   - azithromycin (ZITHROMAX) 250 MG Tab; As directed  Dispense: 6 Tab; Refill: 0            "

## 2018-03-29 ENCOUNTER — PATIENT OUTREACH (OUTPATIENT)
Dept: HEALTH INFORMATION MANAGEMENT | Facility: OTHER | Age: 38
End: 2018-03-29

## 2018-03-29 NOTE — PROGRESS NOTES
Attempt #:1    WebIZ Checked & Epic Updated: yes  HealthConnect Verified: no  Verify PCP: yes    Communication Preference Obtained: yes     Review Care Team: no       Care Gap Scheduling (Attempt to Schedule EACH Overdue Care Gap!)  Health Maintenance Due   Topic Date Due   • IMM DTaP/Tdap/Td Vaccine (1 - Tdap) 10/22/2011       SCHEDULED TDAP    MyChart Activation: ACTIVE  MyChart Matilda: no  Virtual Visits: no  Opt In to Text Messages: no

## 2018-03-29 NOTE — PROGRESS NOTES
Outcome: Left Message to schedule immunizations     Please transfer to Patient Outreach Team at [phone] when patient returns call.    WebIZ Checked & Epic Updated:  yes        Attempt # 1

## 2018-04-16 ENCOUNTER — HOSPITAL ENCOUNTER (OUTPATIENT)
Dept: LAB | Facility: MEDICAL CENTER | Age: 38
End: 2018-04-16
Attending: FAMILY MEDICINE
Payer: COMMERCIAL

## 2018-04-16 DIAGNOSIS — E78.2 MIXED HYPERLIPIDEMIA: ICD-10-CM

## 2018-04-16 DIAGNOSIS — Z13.29 SCREENING FOR ENDOCRINE DISORDER: ICD-10-CM

## 2018-04-16 LAB
ALBUMIN SERPL BCP-MCNC: 4.1 G/DL (ref 3.2–4.9)
ALBUMIN/GLOB SERPL: 1.3 G/DL
ALP SERPL-CCNC: 58 U/L (ref 30–99)
ALT SERPL-CCNC: 20 U/L (ref 2–50)
ANION GAP SERPL CALC-SCNC: 6 MMOL/L (ref 0–11.9)
AST SERPL-CCNC: 17 U/L (ref 12–45)
BILIRUB SERPL-MCNC: 0.7 MG/DL (ref 0.1–1.5)
BUN SERPL-MCNC: 16 MG/DL (ref 8–22)
CALCIUM SERPL-MCNC: 9 MG/DL (ref 8.5–10.5)
CHLORIDE SERPL-SCNC: 106 MMOL/L (ref 96–112)
CHOLEST SERPL-MCNC: 215 MG/DL (ref 100–199)
CO2 SERPL-SCNC: 26 MMOL/L (ref 20–33)
CREAT SERPL-MCNC: 1.02 MG/DL (ref 0.5–1.4)
GLOBULIN SER CALC-MCNC: 3.1 G/DL (ref 1.9–3.5)
GLUCOSE SERPL-MCNC: 87 MG/DL (ref 65–99)
HDLC SERPL-MCNC: 35 MG/DL
LDLC SERPL CALC-MCNC: 139 MG/DL
POTASSIUM SERPL-SCNC: 4 MMOL/L (ref 3.6–5.5)
PROT SERPL-MCNC: 7.2 G/DL (ref 6–8.2)
SODIUM SERPL-SCNC: 138 MMOL/L (ref 135–145)
TESTOST SERPL-MCNC: 211 NG/DL (ref 175–781)
TRIGL SERPL-MCNC: 204 MG/DL (ref 0–149)

## 2018-04-16 PROCEDURE — 80061 LIPID PANEL: CPT

## 2018-04-16 PROCEDURE — 36415 COLL VENOUS BLD VENIPUNCTURE: CPT

## 2018-04-16 PROCEDURE — 84403 ASSAY OF TOTAL TESTOSTERONE: CPT

## 2018-04-16 PROCEDURE — 80053 COMPREHEN METABOLIC PANEL: CPT

## 2018-04-24 ENCOUNTER — OFFICE VISIT (OUTPATIENT)
Dept: MEDICAL GROUP | Facility: PHYSICIAN GROUP | Age: 38
End: 2018-04-24
Payer: COMMERCIAL

## 2018-04-24 VITALS
RESPIRATION RATE: 16 BRPM | WEIGHT: 249.56 LBS | OXYGEN SATURATION: 95 % | SYSTOLIC BLOOD PRESSURE: 108 MMHG | TEMPERATURE: 97.6 F | HEART RATE: 92 BPM | DIASTOLIC BLOOD PRESSURE: 70 MMHG | HEIGHT: 75 IN | BODY MASS INDEX: 31.03 KG/M2

## 2018-04-24 DIAGNOSIS — E78.2 MIXED HYPERLIPIDEMIA: ICD-10-CM

## 2018-04-24 DIAGNOSIS — R79.89 LOW TESTOSTERONE: ICD-10-CM

## 2018-04-24 PROCEDURE — 99213 OFFICE O/P EST LOW 20 MIN: CPT | Performed by: FAMILY MEDICINE

## 2018-04-24 NOTE — PROGRESS NOTES
Chief Complaint   Patient presents with   • Hyperlipidemia     fv labs       HISTORY OF PRESENT ILLNESS: Patient is a 37 y.o. male established patient here today for the following concerns:    1. Mixed hyperlipidemia  Here today for follow up on labs.  Has been working out more regularly and watching diet.  Lipids reviewed and risk calculated to 1.9%.     2. Low testosterone  Previously on Testosterone therapy with some improvement of symptoms but not enough to continue therapy.  Recheck shows low levels of testosterone.  Overall wishes to continue off therapy.      Past Medical, Social, and Family history reviewed and updated in EPIC    Allergies:Patient has no known allergies.    Current Outpatient Prescriptions   Medication Sig Dispense Refill   • azithromycin (ZITHROMAX) 250 MG Tab As directed 6 Tab 0   • Cyanocobalamin (VITAMIN B-12 PO) Take  by mouth.     • Ascorbic Acid (VITAMIN C PO) Take  by mouth.     • Omega-3 Fatty Acids (FISH OIL PO) Take  by mouth.     • Omega-3 Fatty Acids (OMEGA 3 PO) Take  by mouth.     • GLUCOSAMINE-CHONDROITIN PO Take  by mouth.     • therapeutic multivitamin-minerals (THERAGRAN-M) Tab Take 1 Tab by mouth every day.     • cyclobenzaprine (FLEXERIL) 5 MG tablet Take 1-2 Tabs by mouth 3 times a day as needed. 45 Tab 0     No current facility-administered medications for this visit.          ROS:  Review of Systems   Constitutional: Negative for fever, chills, weight loss and malaise/fatigue.   HENT: Negative for ear pain, nosebleeds, congestion, sore throat and neck pain.    Eyes: Negative for blurred vision.   Respiratory: Negative for cough, sputum production, shortness of breath and wheezing.    Cardiovascular: Negative for chest pain, palpitations,  and leg swelling.   Gastrointestinal: Negative for heartburn, nausea, vomiting, diarrhea and abdominal pain.   Genitourinary: Negative for dysuria, urgency and frequency.   Musculoskeletal: Negative for myalgias, back pain and joint  "pain.   Skin: Negative for rash and itching.   Neurological: Negative for dizziness, tingling, tremors, sensory change, focal weakness and headaches.   Endo/Heme/Allergies: Does not bruise/bleed easily.   Psychiatric/Behavioral: Negative for depression, anxiety, suicidal ideas, insomnia and memory loss.      Exam:  Blood pressure 108/70, pulse 92, temperature 36.4 °C (97.6 °F), resp. rate 16, height 1.905 m (6' 3\"), weight 113.2 kg (249 lb 9 oz), SpO2 95 %.    General:  Well nourished, well developed in NAD  Head is grossly normal.  Neck: Supple without JVD   Pulmonary:  Normal effort.   Cardiovascular: Regular rate  Extremities: no clubbing, cyanosis, or edema.  Psych: affect appropriate      Please note that this dictation was created using voice recognition software. I have made every reasonable attempt to correct obvious errors, but I expect that there are errors of grammar and possibly content that I did not discover before finalizing the note.    Assessment/Plan:  1. Mixed hyperlipidemia  Continue TLCs    2. Low testosterone  Continue to monitor symptoms, if becomes more symptomatic will recheck    Follow up 12 months, sooner prn.         "

## 2018-05-24 ENCOUNTER — TELEPHONE (OUTPATIENT)
Dept: MEDICAL GROUP | Facility: PHYSICIAN GROUP | Age: 38
End: 2018-05-24

## 2018-05-24 DIAGNOSIS — Z71.9 COUNSELING, UNSPECIFIED: ICD-10-CM

## 2018-05-24 NOTE — TELEPHONE ENCOUNTER
1. Caller Name: Theresa/wife                                         Call Back Number: [phone]      Patient approves a detailed voicemail message: N\A    2. SPECIFIC Action To Be Taken: Referral pending, please sign.    3. Diagnosis/Clinical Reason for Request: Family counseling    4. [facility]    5. Is appointment scheduled for requested order/referral: yes - July  Has been seen for past 2 yrs and has had a change in ins    Patient informed they will receive a return phone call from the office ONLY if there are any questions before processing their request. Advised to call back if they haven't received a call from the referral department in 5 days.

## 2021-10-26 ENCOUNTER — APPOINTMENT (RX ONLY)
Dept: URBAN - METROPOLITAN AREA CLINIC 22 | Facility: CLINIC | Age: 41
Setting detail: DERMATOLOGY
End: 2021-10-26

## 2021-10-26 DIAGNOSIS — L72.8 OTHER FOLLICULAR CYSTS OF THE SKIN AND SUBCUTANEOUS TISSUE: ICD-10-CM

## 2021-10-26 PROCEDURE — 99202 OFFICE O/P NEW SF 15 MIN: CPT

## 2021-10-26 PROCEDURE — ? ADDITIONAL NOTES

## 2021-10-26 PROCEDURE — ? COUNSELING

## 2021-10-26 ASSESSMENT — LOCATION ZONE DERM: LOCATION ZONE: TRUNK

## 2021-10-26 ASSESSMENT — LOCATION DETAILED DESCRIPTION DERM: LOCATION DETAILED: RIGHT MID-UPPER BACK

## 2021-10-26 ASSESSMENT — LOCATION SIMPLE DESCRIPTION DERM: LOCATION SIMPLE: RIGHT UPPER BACK
